# Patient Record
Sex: FEMALE | Race: WHITE | NOT HISPANIC OR LATINO | Employment: STUDENT | URBAN - METROPOLITAN AREA
[De-identification: names, ages, dates, MRNs, and addresses within clinical notes are randomized per-mention and may not be internally consistent; named-entity substitution may affect disease eponyms.]

---
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
## 2022-02-12 VITALS
WEIGHT: 137.6 LBS | DIASTOLIC BLOOD PRESSURE: 67 MMHG | HEART RATE: 72 BPM | TEMPERATURE: 97.8 F | SYSTOLIC BLOOD PRESSURE: 110 MMHG

## 2022-02-15 NOTE — PROGRESS NOTES
Patient:   VINNY ALY            MRN: [id number]            FIN: [id number]               Age:   19 years     Sex:  Female     :  2000   Associated Diagnoses:   Neck muscle strain   Author:   Pascual Marino MD      Visit Information      Date of Service: 2020 12:10 pm  Performing Location: [facility]  Encounter#: [id number]      Primary Care Provider (PCP):  NONE ,       Referring Provider:  Pascual Marino MD    NPI# [id number]      Chief Complaint   3/5/2020 12:12 PM CST    Left shoulder/neck/collar bone pain.        History of Present Illness   Patient is here with neck pain.  She notes for several months now she has had pain over her left neck seems to originate over the posterior mid clavicle and radiates posterior medially.  Worse with neck movement.  Is really more of a nuisance and never stops her from doing things but initially when it started last fall it was just intermittent now she notes it all the time she does workout a lot.  No injuries though.  No numbness tingling weakness of her extremities.  No chest pain shortness of breath or cough.         Review of Systems   Constitutional:  Negative except as documented in history of present illness.    Musculoskeletal:  Negative except as documented in history of present illness.    Integumentary:  Negative.    Neurologic:  Negative.       Health Status   Allergies:    No active allergies have been recorded.   Medications:    Medications          No medications documented        Histories   Past Medical History:    No active or resolved past medical history items have been selected or recorded.   Family History:    No family history items have been selected or recorded.   Procedure history:    No active procedure history items have been selected or recorded.   Social History:             No active social history items have been recorded.      Physical Examination   Vital Signs   3/5/2020 12:12 PM CST Temperature Tympanic  97.8 DegF  LOW    Peripheral Pulse Rate 72 bpm    HR Method Electronic    Systolic Blood Pressure 110 mmHg    Diastolic Blood Pressure 67 mmHg    Mean Arterial Pressure 81 mmHg    BP Method Electronic      Measurements from flowsheet : Measurements   3/5/2020 12:12 PM CST    Weight Measured - Standard                137.6 lb     General:  Alert and oriented, No acute distress.    Neck:  Supple, No lymphadenopathy, No thyromegaly, Patient has tenderness over the mid to lateral clavicle posteriorly extending up along the trapezial muscle.  Worse with trapezial use.  There is no masses no rash no clavicular tenderness her left extremity CMS is intact neck reveals full range of motion  .    Respiratory:  Respirations are non-labored.       Impression and Plan   Diagnosis     Neck muscle strain ([id number] S16.1XXA).     Course:  Not progressing as expected.    Plan:  Patient with chronic trapezial strain.  She will work with physical therapy they work with her on modifying her exercise routine if is not improving over the next several weeks consider for more advanced imaging  .    Patient Instructions:       Counseled: Patient, Friend, Regarding diagnosis, Regarding treatment, Activity.

## 2022-02-15 NOTE — NURSING NOTE
Comprehensive Intake Entered On:  3/5/2020 12:15 PM CST    Performed On:  3/5/2020 12:12 PM CST by Mariana Kan               Summary   Chief Complaint :   Left shoulder/neck/collar bone pain.    Weight Measured :   137.6 lb(Converted to: 137 lb 10 oz, 62.41 kg)    Systolic Blood Pressure :   110 mmHg   Diastolic Blood Pressure :   67 mmHg   Mean Arterial Pressure :   81 mmHg   Peripheral Pulse Rate :   72 bpm   BP Method :   Electronic   HR Method :   Electronic   Temperature Tympanic :   97.8 DegF(Converted to: 36.6 DegC)  (LOW)    Mariana Kan - 3/5/2020 12:12 PM CST   Health Status   Allergies Verified? :   Yes   Medication History Verified? :   Yes   Tobacco Use? :   Never smoker   Mariana Kan - 3/5/2020 12:12 PM CST   Meds / Allergies   (As Of: 3/5/2020 12:15:00 PM CST)     Medication List   (As Of: 3/5/2020 12:15:00 PM CST)

## 2022-02-15 NOTE — NURSING NOTE
Depression Screening Entered On:  3/6/2020 10:26 AM CST    Performed On:  3/5/2020 10:26 AM CST by Lis Hardy               Depression Screening   Little Interest - Pleasure in Activities :   Not at all   Feeling Down, Depressed, Hopeless :   Not at all   Initial Depression Screen Score :   0    Trouble Falling or Staying Asleep :   Not at all   Feeling Tired or Little Energy :   Several days   Poor Appetite or Overeating :   Not at all   Feeling Bad About Yourself :   Not at all   Trouble Concentrating :   Not at all   Moving or Speaking Slowly :   Not at all   Thoughts Better Off Dead or Hurting Self :   Not at all   Detailed Depression Screen Score :   1    Total Depression Screen Score :   1    HELEN Difficulty with Work, Home, Others :   Not difficult at all   Lis Hardy - 3/6/2020 10:26 AM CST

## 2022-09-24 ENCOUNTER — OFFICE VISIT (OUTPATIENT)
Dept: URGENT CARE | Facility: URGENT CARE | Age: 22
End: 2022-09-24
Payer: COMMERCIAL

## 2022-09-24 VITALS
HEIGHT: 66 IN | HEART RATE: 70 BPM | SYSTOLIC BLOOD PRESSURE: 102 MMHG | WEIGHT: 136.7 LBS | BODY MASS INDEX: 21.97 KG/M2 | DIASTOLIC BLOOD PRESSURE: 72 MMHG | TEMPERATURE: 98.5 F

## 2022-09-24 DIAGNOSIS — R30.9 PAINFUL URINATION: Primary | ICD-10-CM

## 2022-09-24 LAB
ALBUMIN UR-MCNC: NEGATIVE MG/DL
APPEARANCE UR: ABNORMAL
BACTERIA #/AREA URNS HPF: ABNORMAL /HPF
BILIRUB UR QL STRIP: NEGATIVE
CLUE CELLS: ABNORMAL
COLOR UR AUTO: ABNORMAL
GLUCOSE UR STRIP-MCNC: NEGATIVE MG/DL
HGB UR QL STRIP: NEGATIVE
KETONES UR STRIP-MCNC: NEGATIVE MG/DL
LEUKOCYTE ESTERASE UR QL STRIP: NEGATIVE
NITRATE UR QL: NEGATIVE
PH UR STRIP: 6 [PH] (ref 5–7)
RBC #/AREA URNS AUTO: ABNORMAL /HPF
SP GR UR STRIP: 1.01 (ref 1–1.03)
SQUAMOUS #/AREA URNS AUTO: ABNORMAL /LPF
TRICHOMONAS, WET PREP: ABNORMAL
UROBILINOGEN UR STRIP-ACNC: 0.2 E.U./DL
WBC #/AREA URNS AUTO: ABNORMAL /HPF
WBC'S/HIGH POWER FIELD, WET PREP: ABNORMAL
YEAST, WET PREP: ABNORMAL

## 2022-09-24 PROCEDURE — 87210 SMEAR WET MOUNT SALINE/INK: CPT | Performed by: PHYSICIAN ASSISTANT

## 2022-09-24 PROCEDURE — 87086 URINE CULTURE/COLONY COUNT: CPT | Performed by: PHYSICIAN ASSISTANT

## 2022-09-24 PROCEDURE — 99203 OFFICE O/P NEW LOW 30 MIN: CPT | Performed by: PHYSICIAN ASSISTANT

## 2022-09-24 PROCEDURE — 87591 N.GONORRHOEAE DNA AMP PROB: CPT | Performed by: PHYSICIAN ASSISTANT

## 2022-09-24 PROCEDURE — 81001 URINALYSIS AUTO W/SCOPE: CPT

## 2022-09-24 PROCEDURE — 87491 CHLMYD TRACH DNA AMP PROBE: CPT | Performed by: PHYSICIAN ASSISTANT

## 2022-09-24 RX ORDER — NITROFURANTOIN 25; 75 MG/1; MG/1
100 CAPSULE ORAL 2 TIMES DAILY
Qty: 10 CAPSULE | Refills: 0 | Status: SHIPPED | OUTPATIENT
Start: 2022-09-24 | End: 2022-09-29

## 2022-09-24 RX ORDER — NORETHINDRONE ACETATE AND ETHINYL ESTRADIOL AND FERROUS FUMARATE 5-7-9-7
1 KIT ORAL DAILY
COMMUNITY

## 2022-09-24 NOTE — PROGRESS NOTES
"Assessment & Plan      1. Painful urination  UA is not highly suspicious for uti, but will culture.    Moderate epis thus more contamination. Will obtain GC chlamydia. Wet prep is unremarkable.    Will follow-up for persistent or worsening sx.    Will call with results of urine culture and GC/chlamydia results.    macrobid awaiting culture.     - UA with Microscopic reflex to Culture - lab collect; Future  - UA with Microscopic reflex to Culture - lab collect; Future  - UA with Microscopic reflex to Culture - lab collect  - Urine Microscopic  - Urine Culture Aerobic Bacterial - lab collect; Future  - NEISSERIA GONORRHOEA PCR  - CHLAMYDIA TRACHOMATIS PCR  - Wet prep - Clinic Collect  - Urine Culture Aerobic Bacterial - lab collect    30 minutes spent on the date of the encounter doing chart review, review of outside records, review of test results, interpretation of tests and documentation       [facility] [place]    Jose Mo is a 22 year old female who presents to clinic today for the following health issues:  Chief Complaint   Patient presents with     UTI     Symptoms include, pain, frequency, pain in the area, symptoms x  week      HPI  Pain in the genital area, now dysuria, frequency.    No vaginal itching, irritation, change of discharge.    No sores in the genital area.    Last one a few months ago seen in the ED, states was bad.  Urine culture was negative.  Believes she was treated with abx.  Reports the GC/Chlamydia tests were negative.       Review of Systems  Constitutional, HEENT, cardiovascular, pulmonary, gi and gu systems are negative, except as otherwise noted.      Objective    /72 (BP Location: Right arm, Patient Position: Sitting, Cuff Size: Adult Regular)   Pulse 70   Temp 98.5  F (36.9  C) (Oral)   Ht 1.664 m (5' 5.5\")   Wt 62 kg (136 lb 11.2 oz)   LMP 08/24/2022   BMI 22.40 kg/m    Physical Exam   Patient is in no acute " distress and appears well.  No costovertebral angle tenderness is present.  Abdomen is soft nontender to light or deep palpation no masses or hepatosplenomegaly present.  Results for orders placed or performed in visit on 09/24/22   UA with Microscopic reflex to Culture - lab collect     Status: Abnormal    Specimen: Urine, Clean Catch   Result Value Ref Range    Color Urine Light Yellow Colorless, Straw, Light Yellow, Yellow    Appearance Urine Slightly Cloudy (A) Clear    Glucose Urine Negative Negative mg/dL    Bilirubin Urine Negative Negative    Ketones Urine Negative Negative mg/dL    Specific Gravity Urine 1.010 1.003 - 1.035    Blood Urine Negative Negative    pH Urine 6.0 5.0 - 7.0    Protein Albumin Urine Negative Negative mg/dL    Urobilinogen Urine 0.2 0.2, 1.0 E.U./dL    Nitrite Urine Negative Negative    Leukocyte Esterase Urine Negative Negative   Urine Microscopic     Status: Abnormal   Result Value Ref Range    Bacteria Urine Many (A) None Seen /HPF    RBC Urine 0-2 0-2 /HPF /HPF    WBC Urine 5-10 (A) 0-5 /HPF /HPF    Squamous Epithelials Urine Moderate (A) None Seen /LPF    Narrative    Urine Culture not indicated

## 2022-09-25 LAB
C TRACH DNA SPEC QL NAA+PROBE: NEGATIVE
N GONORRHOEA DNA SPEC QL NAA+PROBE: NEGATIVE

## 2022-09-26 LAB — BACTERIA UR CULT: NORMAL

## 2022-09-26 NOTE — RESULT ENCOUNTER NOTE
I called pt and gave here KSA result message.  Pt states she took one Macrobid and felt better the next day then stopped taking it after that.  Pt had no other questions or concerns at this time.  Keith Barber CMA

## 2022-09-26 NOTE — RESULT ENCOUNTER NOTE
I left message on unidentified voicemail to return my call.  [phone]  Keith Barber CMA